# Patient Record
Sex: MALE | Race: WHITE
[De-identification: names, ages, dates, MRNs, and addresses within clinical notes are randomized per-mention and may not be internally consistent; named-entity substitution may affect disease eponyms.]

---

## 2018-06-13 ENCOUNTER — HOSPITAL ENCOUNTER (INPATIENT)
Dept: HOSPITAL 15 - ER | Age: 63
LOS: 1 days | Discharge: ANOTHER HOSPITAL | DRG: 379 | End: 2018-06-14
Attending: FAMILY MEDICINE | Admitting: INTERNAL MEDICINE
Payer: MEDICARE

## 2018-06-13 VITALS — HEIGHT: 72 IN | BODY MASS INDEX: 31.2 KG/M2 | WEIGHT: 230.38 LBS

## 2018-06-13 DIAGNOSIS — F12.10: ICD-10-CM

## 2018-06-13 DIAGNOSIS — M54.9: ICD-10-CM

## 2018-06-13 DIAGNOSIS — M19.90: ICD-10-CM

## 2018-06-13 DIAGNOSIS — E87.6: ICD-10-CM

## 2018-06-13 DIAGNOSIS — G47.00: ICD-10-CM

## 2018-06-13 DIAGNOSIS — Z88.6: ICD-10-CM

## 2018-06-13 DIAGNOSIS — K92.2: Primary | ICD-10-CM

## 2018-06-13 DIAGNOSIS — G89.29: ICD-10-CM

## 2018-06-13 DIAGNOSIS — F10.10: ICD-10-CM

## 2018-06-13 DIAGNOSIS — T39.395A: ICD-10-CM

## 2018-06-13 DIAGNOSIS — I10: ICD-10-CM

## 2018-06-13 DIAGNOSIS — Z79.82: ICD-10-CM

## 2018-06-13 DIAGNOSIS — F41.9: ICD-10-CM

## 2018-06-13 DIAGNOSIS — R31.9: ICD-10-CM

## 2018-06-13 DIAGNOSIS — Z79.899: ICD-10-CM
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
## 2018-06-13 DIAGNOSIS — E11.9: ICD-10-CM

## 2018-06-13 LAB
ALBUMIN SERPL-MCNC: 4 G/DL (ref 3.4–5)
ALCOHOL, URINE: < 3 MG/DL (ref 0–5)
ALP SERPL-CCNC: 83 U/L (ref 45–117)
ALT SERPL-CCNC: 48 U/L (ref 16–61)
AMPHETAMINES UR QL SCN: NEGATIVE
ANION GAP SERPL CALCULATED.3IONS-SCNC: 11 MMOL/L (ref 5–15)
APTT PPP: 28.5 SEC (ref 23.78–33.04)
BARBITURATES UR QL SCN: NEGATIVE
BENZODIAZ UR QL SCN: NEGATIVE
BILIRUB SERPL-MCNC: 0.3 MG/DL (ref 0.2–1)
BUN SERPL-MCNC: 18 MG/DL (ref 7–18)
BUN/CREAT SERPL: 16.7
BZE UR QL SCN: NEGATIVE
CALCIUM SERPL-MCNC: 9 MG/DL (ref 8.5–10.1)
CANNABINOIDS UR QL SCN: NEGATIVE
CHLORIDE SERPL-SCNC: 102 MMOL/L (ref 98–107)
CO2 SERPL-SCNC: 25 MMOL/L (ref 21–32)
GLUCOSE SERPL-MCNC: 99 MG/DL (ref 74–106)
HCT VFR BLD AUTO: 45.2 % (ref 41–53)
HGB BLD-MCNC: 15.4 G/DL (ref 13.5–17.5)
INR PPP: 0.88 (ref 0.9–1.15)
MAGNESIUM SERPL-MCNC: 2.2 MG/DL (ref 1.6–2.6)
MCH RBC QN AUTO: 31.1 PG (ref 28–32)
MCV RBC AUTO: 91.1 FL (ref 80–100)
NRBC BLD QL AUTO: 0.1 %
OPIATES UR QL SCN: NEGATIVE
PCP UR QL SCN: NEGATIVE
POTASSIUM SERPL-SCNC: 4.1 MMOL/L (ref 3.5–5.1)
PROT SERPL-MCNC: 8 G/DL (ref 6.4–8.2)
PROTHROMBIN TIME: 9.5 SEC (ref 9.27–12.13)
SODIUM SERPL-SCNC: 138 MMOL/L (ref 136–145)

## 2018-06-13 PROCEDURE — 86901 BLOOD TYPING SEROLOGIC RH(D): CPT

## 2018-06-13 PROCEDURE — 87081 CULTURE SCREEN ONLY: CPT

## 2018-06-13 PROCEDURE — 80307 DRUG TEST PRSMV CHEM ANLYZR: CPT

## 2018-06-13 PROCEDURE — 86900 BLOOD TYPING SEROLOGIC ABO: CPT

## 2018-06-13 PROCEDURE — 80053 COMPREHEN METABOLIC PANEL: CPT

## 2018-06-13 PROCEDURE — 81001 URINALYSIS AUTO W/SCOPE: CPT

## 2018-06-13 PROCEDURE — 96375 TX/PRO/DX INJ NEW DRUG ADDON: CPT

## 2018-06-13 PROCEDURE — 85025 COMPLETE CBC W/AUTO DIFF WBC: CPT

## 2018-06-13 PROCEDURE — 83735 ASSAY OF MAGNESIUM: CPT

## 2018-06-13 PROCEDURE — 85610 PROTHROMBIN TIME: CPT

## 2018-06-13 PROCEDURE — 36415 COLL VENOUS BLD VENIPUNCTURE: CPT

## 2018-06-13 PROCEDURE — 85018 HEMOGLOBIN: CPT

## 2018-06-13 PROCEDURE — 85730 THROMBOPLASTIN TIME PARTIAL: CPT

## 2018-06-13 PROCEDURE — 96374 THER/PROPH/DIAG INJ IV PUSH: CPT

## 2018-06-13 PROCEDURE — 84484 ASSAY OF TROPONIN QUANT: CPT

## 2018-06-13 PROCEDURE — 71046 X-RAY EXAM CHEST 2 VIEWS: CPT

## 2018-06-13 PROCEDURE — 80061 LIPID PANEL: CPT

## 2018-06-13 PROCEDURE — 86850 RBC ANTIBODY SCREEN: CPT

## 2018-06-13 PROCEDURE — 85014 HEMATOCRIT: CPT

## 2018-06-13 PROCEDURE — 85045 AUTOMATED RETICULOCYTE COUNT: CPT

## 2018-06-13 PROCEDURE — 74176 CT ABD & PELVIS W/O CONTRAST: CPT

## 2018-06-13 PROCEDURE — 82962 GLUCOSE BLOOD TEST: CPT

## 2018-06-13 PROCEDURE — 93005 ELECTROCARDIOGRAM TRACING: CPT

## 2018-06-13 PROCEDURE — 83690 ASSAY OF LIPASE: CPT

## 2018-06-13 RX ADMIN — SODIUM CHLORIDE SCH MLS/HR: 0.9 INJECTION, SOLUTION INTRAVENOUS at 19:44

## 2018-06-13 RX ADMIN — BENAZEPRIL HYDROCHLORIDE SCH MG: 10 TABLET, FILM COATED ORAL at 19:45

## 2018-06-14 VITALS — DIASTOLIC BLOOD PRESSURE: 79 MMHG | SYSTOLIC BLOOD PRESSURE: 146 MMHG

## 2018-06-14 VITALS — DIASTOLIC BLOOD PRESSURE: 80 MMHG | SYSTOLIC BLOOD PRESSURE: 150 MMHG

## 2018-06-14 VITALS — SYSTOLIC BLOOD PRESSURE: 162 MMHG | DIASTOLIC BLOOD PRESSURE: 74 MMHG

## 2018-06-14 LAB
CHOLEST SERPL-MCNC: 201 MG/DL (ref ?–200)
HCT VFR BLD AUTO: 39.6 % (ref 41–53)
HCT VFR BLD AUTO: 41 % (ref 41–53)
HCT VFR BLD AUTO: 42.3 % (ref 41–53)
HDLC SERPL-MCNC: 44 MG/DL (ref 40–59)
HGB BLD-MCNC: 13.8 G/DL (ref 13.5–17.5)
HGB BLD-MCNC: 13.9 G/DL (ref 13.5–17.5)
HGB BLD-MCNC: 14.6 G/DL (ref 13.5–17.5)
MCH RBC QN AUTO: 32 PG (ref 28–32)
MCV RBC AUTO: 91.6 FL (ref 80–100)
NRBC BLD QL AUTO: 0.1 %
TRIGL SERPL-MCNC: 241 MG/DL (ref ?–150)

## 2018-06-14 RX ADMIN — BENAZEPRIL HYDROCHLORIDE SCH MG: 10 TABLET, FILM COATED ORAL at 10:00

## 2018-06-14 RX ADMIN — SODIUM CHLORIDE SCH MLS/HR: 0.9 INJECTION, SOLUTION INTRAVENOUS at 11:24

## 2018-06-14 RX ADMIN — SODIUM CHLORIDE SCH MLS/HR: 0.9 INJECTION, SOLUTION INTRAVENOUS at 02:16

## 2019-02-22 ENCOUNTER — HOSPITAL ENCOUNTER (INPATIENT)
Dept: HOSPITAL 15 - ER | Age: 64
LOS: 3 days | Discharge: HOME | DRG: 309 | End: 2019-02-25
Attending: INTERNAL MEDICINE | Admitting: NURSE PRACTITIONER
Payer: COMMERCIAL

## 2019-02-22 VITALS — WEIGHT: 240.3 LBS | BODY MASS INDEX: 32.55 KG/M2 | HEIGHT: 72 IN

## 2019-02-22 DIAGNOSIS — Z82.3: ICD-10-CM

## 2019-02-22 DIAGNOSIS — Z79.899: ICD-10-CM

## 2019-02-22 DIAGNOSIS — E11.9: ICD-10-CM

## 2019-02-22 DIAGNOSIS — I10: ICD-10-CM

## 2019-02-22 DIAGNOSIS — Z82.49: ICD-10-CM

## 2019-02-22 DIAGNOSIS — I24.9: ICD-10-CM

## 2019-02-22 DIAGNOSIS — Z79.82: ICD-10-CM

## 2019-02-22 DIAGNOSIS — E66.01: ICD-10-CM

## 2019-02-22 DIAGNOSIS — Z88.8: ICD-10-CM

## 2019-02-22 DIAGNOSIS — E78.5: ICD-10-CM

## 2019-02-22 DIAGNOSIS — I49.3: Primary | ICD-10-CM

## 2019-02-22 DIAGNOSIS — I08.0: ICD-10-CM

## 2019-02-22 DIAGNOSIS — I25.10: ICD-10-CM

## 2019-02-22 DIAGNOSIS — F41.9: ICD-10-CM

## 2019-02-22 DIAGNOSIS — F12.90: ICD-10-CM

## 2019-02-22 LAB
ALBUMIN SERPL-MCNC: 4 G/DL (ref 3.4–5)
ALCOHOL, URINE: < 3 MG/DL (ref 0–5)
ALP SERPL-CCNC: 63 U/L (ref 45–117)
ALT SERPL-CCNC: 36 U/L (ref 16–61)
AMPHETAMINES UR QL SCN: NEGATIVE
ANION GAP SERPL CALCULATED.3IONS-SCNC: 10 MMOL/L (ref 5–15)
BARBITURATES UR QL SCN: NEGATIVE
BENZODIAZ UR QL SCN: NEGATIVE
BILIRUB SERPL-MCNC: 0.3 MG/DL (ref 0.2–1)
BUN SERPL-MCNC: 11 MG/DL (ref 7–18)
BUN/CREAT SERPL: 12.4
BZE UR QL SCN: NEGATIVE
CALCIUM SERPL-MCNC: 8.5 MG/DL (ref 8.5–10.1)
CANNABINOIDS UR QL SCN: NEGATIVE
CHLORIDE SERPL-SCNC: 106 MMOL/L (ref 98–107)
CO2 SERPL-SCNC: 24 MMOL/L (ref 21–32)
GLUCOSE SERPL-MCNC: 121 MG/DL (ref 74–106)
HCT VFR BLD AUTO: 45.6 % (ref 41–53)
HGB BLD-MCNC: 15.7 G/DL (ref 13.5–17.5)
MAGNESIUM SERPL-MCNC: 2 MG/DL (ref 1.6–2.6)
MCH RBC QN AUTO: 31.5 PG (ref 28–32)
MCV RBC AUTO: 91.9 FL (ref 80–100)
NRBC BLD QL AUTO: 0 %
OPIATES UR QL SCN: NEGATIVE
PCP UR QL SCN: NEGATIVE
POTASSIUM SERPL-SCNC: 3.6 MMOL/L (ref 3.5–5.1)
PROT SERPL-MCNC: 8.2 G/DL (ref 6.4–8.2)
SODIUM SERPL-SCNC: 140 MMOL/L (ref 136–145)

## 2019-02-22 PROCEDURE — 83735 ASSAY OF MAGNESIUM: CPT

## 2019-02-22 PROCEDURE — 93017 CV STRESS TEST TRACING ONLY: CPT

## 2019-02-22 PROCEDURE — 83880 ASSAY OF NATRIURETIC PEPTIDE: CPT

## 2019-02-22 PROCEDURE — 36415 COLL VENOUS BLD VENIPUNCTURE: CPT

## 2019-02-22 PROCEDURE — 93005 ELECTROCARDIOGRAM TRACING: CPT

## 2019-02-22 PROCEDURE — 80307 DRUG TEST PRSMV CHEM ANLYZR: CPT

## 2019-02-22 PROCEDURE — 80053 COMPREHEN METABOLIC PANEL: CPT

## 2019-02-22 PROCEDURE — 82962 GLUCOSE BLOOD TEST: CPT

## 2019-02-22 PROCEDURE — 80048 BASIC METABOLIC PNL TOTAL CA: CPT

## 2019-02-22 PROCEDURE — 78452 HT MUSCLE IMAGE SPECT MULT: CPT

## 2019-02-22 PROCEDURE — 71045 X-RAY EXAM CHEST 1 VIEW: CPT

## 2019-02-22 PROCEDURE — 84484 ASSAY OF TROPONIN QUANT: CPT

## 2019-02-22 PROCEDURE — 85025 COMPLETE CBC W/AUTO DIFF WBC: CPT

## 2019-02-22 PROCEDURE — 84443 ASSAY THYROID STIM HORMONE: CPT

## 2019-02-22 PROCEDURE — 81001 URINALYSIS AUTO W/SCOPE: CPT

## 2019-02-22 NOTE — NUR
Opening Shift Note

Assumed care of patient, awake and alert x 4.  No S/S of distress/SOB or pain. Bed is in 
lowest position and locked. Call light within reach. Board updated. Patient does report 
anxiety so Alprazolam will be given per MD order. Tele box number matches monitor and leads 
are in correct placement. Instructed on POC and to call for assist PRN, will continue to 
monitor for changes Q1hr and PRN.

-------------------------------------------------------------------------------

Addendum: 02/23/19 at 1532 by LEATHA TREVIZO RN

-------------------------------------------------------------------------------

wrong day, disregard

## 2019-02-23 VITALS — DIASTOLIC BLOOD PRESSURE: 72 MMHG | SYSTOLIC BLOOD PRESSURE: 121 MMHG

## 2019-02-23 VITALS — SYSTOLIC BLOOD PRESSURE: 127 MMHG | DIASTOLIC BLOOD PRESSURE: 64 MMHG

## 2019-02-23 VITALS — DIASTOLIC BLOOD PRESSURE: 73 MMHG | SYSTOLIC BLOOD PRESSURE: 151 MMHG

## 2019-02-23 VITALS — SYSTOLIC BLOOD PRESSURE: 145 MMHG | DIASTOLIC BLOOD PRESSURE: 77 MMHG

## 2019-02-23 VITALS — SYSTOLIC BLOOD PRESSURE: 121 MMHG | DIASTOLIC BLOOD PRESSURE: 70 MMHG

## 2019-02-23 VITALS — SYSTOLIC BLOOD PRESSURE: 125 MMHG | DIASTOLIC BLOOD PRESSURE: 65 MMHG

## 2019-02-23 RX ADMIN — FAMOTIDINE SCH MG: 20 TABLET, FILM COATED ORAL at 09:48

## 2019-02-23 RX ADMIN — Medication SCH STRIP: at 23:04

## 2019-02-23 RX ADMIN — HUMAN INSULIN SCH UNITS: 100 INJECTION, SOLUTION SUBCUTANEOUS at 17:45

## 2019-02-23 RX ADMIN — HUMAN INSULIN SCH UNITS: 100 INJECTION, SOLUTION SUBCUTANEOUS at 05:50

## 2019-02-23 RX ADMIN — Medication SCH STRIP: at 11:57

## 2019-02-23 RX ADMIN — ACETAMINOPHEN PRN MG: 325 TABLET ORAL at 21:22

## 2019-02-23 RX ADMIN — FAMOTIDINE SCH MG: 20 TABLET, FILM COATED ORAL at 21:23

## 2019-02-23 RX ADMIN — Medication SCH STRIP: at 05:50

## 2019-02-23 RX ADMIN — Medication SCH STRIP: at 17:44

## 2019-02-23 RX ADMIN — HUMAN INSULIN SCH UNITS: 100 INJECTION, SOLUTION SUBCUTANEOUS at 23:04

## 2019-02-23 RX ADMIN — METOPROLOL SUCCINATE SCH MG: 50 TABLET, EXTENDED RELEASE ORAL at 09:47

## 2019-02-23 RX ADMIN — HUMAN INSULIN SCH UNITS: 100 INJECTION, SOLUTION SUBCUTANEOUS at 11:58

## 2019-02-23 RX ADMIN — ATORVASTATIN CALCIUM SCH MG: 20 TABLET, FILM COATED ORAL at 21:22

## 2019-02-23 NOTE — NUR
DR. VERA HERE TO SEE AND EXAMINED PATIENT,RECEIVED ORDER TO TRANSFER PATIENT TO [facility] PER INSURANCE REQUEST.

-------------------------------------------------------------------------------

Addendum: 02/23/19 at 1324 by Usha Barrera RN RN

-------------------------------------------------------------------------------

TIME @1300

## 2019-02-23 NOTE — NUR
CLOSING SHIFT NOTE 

Gave report to day shift RN. Patient is A&O X's 4 with no s/s of distress. Endorsed care 
over to RN.

## 2019-02-23 NOTE — NUR
Opening Shift Note

Assumed care of patient, awake and alert x 4.  No S/S of distress/SOB or pain. Bed is in 
lowest position and locked. Call light within reach. Board updated. Patient does report 
anxiety so Alprazolam will be given per MD order. Tele box number matches monitor and leads 
are in correct placement. Instructed on POC and to call for assist PRN, will continue to 
monitor for changes Q1hr and PRN.

## 2019-02-23 NOTE — NUR
CALLED AND SPOKE TO SALMA ENGLAND RN HOUSE SUPERVISOR OF [facility] RE 
TRANSFER,REQUESTED FACE SHEET TO BE FAX

-------------------------------------------------------------------------------

Addendum: 02/23/19 at 1627 by Usha Barrera RN RN

-------------------------------------------------------------------------------

1626 STATED NO BED AVAILABLE AT THIS TIME

## 2019-02-23 NOTE — NUR
DR. VERA CALLED INFORMED OF PATIENT SITUATION AND COMPLAINT,RECEIVED ORDER TO INCREASE 
XANAX TO Q 6 HOURS.SEE ORDER WRITTEN.

## 2019-02-23 NOTE — NUR
PATIENT CALLED HAVING PANIC AND ANXIETY ATTACK DUE TO TRANSFER AND DX. OF CHF,REQUESTING FOR 
XANAX,MEDICATION NOT DUE,REASSURED WILL CALL MD TO INCREASE FREQUENCY,PAGED DR. VERA.

## 2019-02-23 NOTE — NUR
Open Shift Note

Received report and continuation of care,Awake,alert oriented no distress,no discomfort, 
Discussed Nursing Routines and POC with patient. Bed in lowest locked position, side rails 
up x2, and call light within reach. Will continue to monitor.

## 2019-02-23 NOTE — NUR
XOCHILT  CALLED BACK MADE AWARE OF ORDER TO TRANSFER TO [facility] PER INSURANCE 
REQUEST

## 2019-02-23 NOTE — NUR
PATIENT ARRIVED TO UNIT 

Patient arrived to unit from ED via wheelchair. Patient ambulatory, A&O X's 4 with no s/s of 
distress and reports no pain. Oriented patient to unit and room (call light/lights/visiting 
hours/tv/phone). Educated patient no POC and to call when in need of assistance/ increase 
SOB or any pain. Patient verbalized understanding. Bed is in lowest/locked position with 
side rails up X's 2. Call light is within reach of patient. Will continue to monitor and 
round hourly/PRN.

## 2019-02-23 NOTE — NUR
Patient refused Lipitor and Pepcid. patient was educated on why they have been ordered for 
patient and medication side effects but patient still refused, saying that Lipitor makes it 
harder for him to sleep at night.

## 2019-02-24 VITALS — DIASTOLIC BLOOD PRESSURE: 61 MMHG | SYSTOLIC BLOOD PRESSURE: 110 MMHG

## 2019-02-24 VITALS — SYSTOLIC BLOOD PRESSURE: 117 MMHG | DIASTOLIC BLOOD PRESSURE: 59 MMHG

## 2019-02-24 VITALS — SYSTOLIC BLOOD PRESSURE: 132 MMHG | DIASTOLIC BLOOD PRESSURE: 64 MMHG

## 2019-02-24 VITALS — SYSTOLIC BLOOD PRESSURE: 124 MMHG | DIASTOLIC BLOOD PRESSURE: 74 MMHG

## 2019-02-24 VITALS — SYSTOLIC BLOOD PRESSURE: 144 MMHG | DIASTOLIC BLOOD PRESSURE: 60 MMHG

## 2019-02-24 LAB
ANION GAP SERPL CALCULATED.3IONS-SCNC: 8 MMOL/L (ref 5–15)
BUN SERPL-MCNC: 14 MG/DL (ref 7–18)
BUN/CREAT SERPL: 15.2
CALCIUM SERPL-MCNC: 8.9 MG/DL (ref 8.5–10.1)
CHLORIDE SERPL-SCNC: 106 MMOL/L (ref 98–107)
CO2 SERPL-SCNC: 27 MMOL/L (ref 21–32)
GLUCOSE SERPL-MCNC: 103 MG/DL (ref 74–106)
POTASSIUM SERPL-SCNC: 3.6 MMOL/L (ref 3.5–5.1)
SODIUM SERPL-SCNC: 141 MMOL/L (ref 136–145)

## 2019-02-24 RX ADMIN — Medication SCH STRIP: at 06:16

## 2019-02-24 RX ADMIN — Medication SCH STRIP: at 17:40

## 2019-02-24 RX ADMIN — ATORVASTATIN CALCIUM SCH MG: 20 TABLET, FILM COATED ORAL at 22:00

## 2019-02-24 RX ADMIN — HUMAN INSULIN SCH UNITS: 100 INJECTION, SOLUTION SUBCUTANEOUS at 17:40

## 2019-02-24 RX ADMIN — HUMAN INSULIN SCH UNITS: 100 INJECTION, SOLUTION SUBCUTANEOUS at 05:58

## 2019-02-24 RX ADMIN — METOPROLOL SUCCINATE SCH MG: 50 TABLET, EXTENDED RELEASE ORAL at 09:24

## 2019-02-24 RX ADMIN — FAMOTIDINE SCH MG: 20 TABLET, FILM COATED ORAL at 09:25

## 2019-02-24 RX ADMIN — ACETAMINOPHEN PRN MG: 325 TABLET ORAL at 21:18

## 2019-02-24 RX ADMIN — FAMOTIDINE SCH MG: 20 TABLET, FILM COATED ORAL at 22:00

## 2019-02-24 RX ADMIN — HUMAN INSULIN SCH UNITS: 100 INJECTION, SOLUTION SUBCUTANEOUS at 12:00

## 2019-02-24 RX ADMIN — Medication SCH STRIP: at 12:50

## 2019-02-24 NOTE — NUR
Called Octavio House Supervisor on nights for [facility] and asked about 
status of patient's transfer. Per Octavio, there are many patient's holding in their ED and no 
beds are available at this time.

## 2019-02-24 NOTE — NUR
Opening Note 

Assumed care of patient. Patient is awake, alert and oriented x4. No signs or symptoms of 
distress noted at this time. Reviewed plan of care with patient, patient verbalized 
understanding. Bed in low and locked position, call light within in reach. Will continue to 
monitor Q1 hour and PRN.

## 2019-02-24 NOTE — NUR
Received a call from [facility]. They were unaware that we had been 
speaking to the House Supervisor. I updated them on the patient's condition. They will be 
getting back to us.

## 2019-02-24 NOTE — NUR
Per Dr Perales, he has spoke to [facility] and patient will be given a bed sometime today.

## 2019-02-24 NOTE — NUR
Patient refuses to have his blood sugar checked for need for insulin coverage. patient 
states that his blood sugar has been normal all day and that he does not take insulin at 
home, only metformin. I explained to the patient the need for frequent blood sugar checks 
and need for insulin coverage now that patient is hospitalized but patient still refused.

## 2019-02-25 VITALS — DIASTOLIC BLOOD PRESSURE: 53 MMHG | SYSTOLIC BLOOD PRESSURE: 110 MMHG

## 2019-02-25 VITALS — SYSTOLIC BLOOD PRESSURE: 108 MMHG | DIASTOLIC BLOOD PRESSURE: 68 MMHG

## 2019-02-25 VITALS — SYSTOLIC BLOOD PRESSURE: 150 MMHG | DIASTOLIC BLOOD PRESSURE: 69 MMHG

## 2019-02-25 VITALS — SYSTOLIC BLOOD PRESSURE: 120 MMHG | DIASTOLIC BLOOD PRESSURE: 72 MMHG

## 2019-02-25 RX ADMIN — HUMAN INSULIN SCH UNITS: 100 INJECTION, SOLUTION SUBCUTANEOUS at 11:09

## 2019-02-25 RX ADMIN — HUMAN INSULIN SCH UNITS: 100 INJECTION, SOLUTION SUBCUTANEOUS at 05:54

## 2019-02-25 RX ADMIN — Medication SCH STRIP: at 05:54

## 2019-02-25 RX ADMIN — FAMOTIDINE SCH MG: 20 TABLET, FILM COATED ORAL at 10:00

## 2019-02-25 RX ADMIN — Medication SCH STRIP: at 11:07

## 2019-02-25 RX ADMIN — METOPROLOL SUCCINATE SCH MG: 50 TABLET, EXTENDED RELEASE ORAL at 10:57

## 2019-02-25 RX ADMIN — HUMAN INSULIN SCH UNITS: 100 INJECTION, SOLUTION SUBCUTANEOUS at 00:00

## 2019-02-25 RX ADMIN — Medication SCH STRIP: at 00:00

## 2019-02-25 NOTE — NUR
Opening Shift Note

REceived report from Summer RN. Assumed care of patient, awake and alert.  No S/S of 
distress/SOB or pain.Emphasized NPO for stress test today. Awaiting bed availability at [facility] 
and patient is aware of it.  Instructed on POC and to call for assist PRN, will continue to 
monitor for changes Q1hr and PRN.

## 2019-02-25 NOTE — NUR
on call

Received a page from Galdino PEREIRA for transfer to in network facility [facility]. Clinicals faxed to 
[facility]. Waiting on bed now. 

-------------------------------------------------------------------------------

Addendum: 02/25/19 at 0821 by Lenka HERNANDEZ

-------------------------------------------------------------------------------

Amended: Links added.

## 2019-02-25 NOTE — NUR
CALLED [facility] APPOINTMENT HOTLINE, SAID THAT THEY CANNOT RESCHEDULE APPOINTMENT FOR NOW. PER 
TAMMIE PATIENT NEEDS TO CALL WHEN HE'S ALREADY AT HOME. PATIENT MADE AWARE AND VERBALIZED 
UNDERSTANDING.

## 2019-02-25 NOTE — NUR
Discharge instructions given as ordered. Encourage to follow up with PMD as instructed. All 
questions and concerns addressed. Patient verbalized understanding. IV removed with catheter 
intact, pressure dressing applied, orozco catheter removed.  Telemetry unit returned to [facility]. 
Patient went home ambulatory, with all personal belongings, accompanied by staff and family 
member. No distress noted at time of departure.

## 2019-02-25 NOTE — NUR
IV insertion

IV access obtained, via clean sterile technique by inserting 22 gauge catheter in left 
forearm, after one attempt. IV secured properly. No trauma to site. Patient tolerated 
procedure well.

## 2019-02-25 NOTE — NUR
RECEIVED VERBAL ORDER FROM DR. VERA TO WAIT FOR PATIENT'S CARDIO CLEARANCE FROM DR. PIMENTEL 
BEFORE PATIENT GOES HOME. PATIENT MADE AWARE AND VERBALIZED UNDERSTANDING.

## 2019-02-25 NOTE — NUR
RECEIVED VERBAL ORDER FROM DR. PIMENTEL THAT PATIENT IS CLEARED TO GO HOME. HAVE TSH BLOOD 
WITHDRAWN, NO NEED TO WAIT FOR STRESS TEST AND TSH RESULTS. PATIENT MADE AWARE AND 
VERBALIZED UNDERSTANDING.

## 2019-02-25 NOTE — NUR
Patient refused accucheck at 0000. 

Stated he wants to sleep, to come back and check in the morning

## 2019-08-19 ENCOUNTER — HOSPITAL ENCOUNTER (EMERGENCY)
Dept: HOSPITAL 15 - ER | Age: 64
Discharge: HOME | End: 2019-08-19
Payer: COMMERCIAL

## 2019-08-19 VITALS — BODY MASS INDEX: 33.86 KG/M2 | WEIGHT: 250 LBS | HEIGHT: 72 IN

## 2019-08-19 VITALS — SYSTOLIC BLOOD PRESSURE: 135 MMHG | DIASTOLIC BLOOD PRESSURE: 65 MMHG

## 2019-08-19 DIAGNOSIS — E78.5: ICD-10-CM

## 2019-08-19 DIAGNOSIS — F10.229: ICD-10-CM
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
## 2019-08-19 DIAGNOSIS — I10: ICD-10-CM
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
## 2019-08-19 DIAGNOSIS — Z79.84: ICD-10-CM

## 2019-08-19 DIAGNOSIS — Z79.82: ICD-10-CM

## 2019-08-19 DIAGNOSIS — Z79.899: ICD-10-CM
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
## 2019-08-19 DIAGNOSIS — F12.90: ICD-10-CM

## 2019-08-19 DIAGNOSIS — E11.9: ICD-10-CM

## 2019-08-19 DIAGNOSIS — Z88.8: ICD-10-CM
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
## 2019-08-19 DIAGNOSIS — S09.90XA: Primary | ICD-10-CM

## 2019-08-19 LAB
ALBUMIN SERPL-MCNC: 3.9 G/DL (ref 3.4–5)
ALCOHOL, URINE: 434 MG/DL (ref 0–5)
ALP SERPL-CCNC: 68 U/L (ref 45–117)
ALT SERPL-CCNC: 62 U/L (ref 16–61)
AMPHETAMINES UR QL SCN: NEGATIVE
ANION GAP SERPL CALCULATED.3IONS-SCNC: 17 MMOL/L (ref 5–15)
BARBITURATES UR QL SCN: NEGATIVE
BENZODIAZ UR QL SCN: POSITIVE
BILIRUB SERPL-MCNC: 0.6 MG/DL (ref 0.2–1)
BUN SERPL-MCNC: 17 MG/DL (ref 7–18)
BUN/CREAT SERPL: 17.5
BZE UR QL SCN: NEGATIVE
CALCIUM SERPL-MCNC: 8.5 MG/DL (ref 8.5–10.1)
CANNABINOIDS UR QL SCN: NEGATIVE
CHLORIDE SERPL-SCNC: 101 MMOL/L (ref 98–107)
CO2 SERPL-SCNC: 19 MMOL/L (ref 21–32)
GLUCOSE SERPL-MCNC: 129 MG/DL (ref 74–106)
HCT VFR BLD AUTO: 47.5 % (ref 41–53)
HGB BLD-MCNC: 16.3 G/DL (ref 13.5–17.5)
MCH RBC QN AUTO: 31.5 PG (ref 28–32)
MCV RBC AUTO: 91.7 FL (ref 80–100)
NRBC BLD QL AUTO: 0 %
OPIATES UR QL SCN: NEGATIVE
PCP UR QL SCN: NEGATIVE
POTASSIUM SERPL-SCNC: 3.9 MMOL/L (ref 3.5–5.1)
PROT SERPL-MCNC: 7.9 G/DL (ref 6.4–8.2)
SODIUM SERPL-SCNC: 137 MMOL/L (ref 136–145)

## 2019-08-19 PROCEDURE — 96366 THER/PROPH/DIAG IV INF ADDON: CPT

## 2019-08-19 PROCEDURE — 74176 CT ABD & PELVIS W/O CONTRAST: CPT

## 2019-08-19 PROCEDURE — 99284 EMERGENCY DEPT VISIT MOD MDM: CPT

## 2019-08-19 PROCEDURE — 36415 COLL VENOUS BLD VENIPUNCTURE: CPT

## 2019-08-19 PROCEDURE — 94761 N-INVAS EAR/PLS OXIMETRY MLT: CPT

## 2019-08-19 PROCEDURE — 85025 COMPLETE CBC W/AUTO DIFF WBC: CPT

## 2019-08-19 PROCEDURE — 93005 ELECTROCARDIOGRAM TRACING: CPT

## 2019-08-19 PROCEDURE — 80307 DRUG TEST PRSMV CHEM ANLYZR: CPT

## 2019-08-19 PROCEDURE — 96365 THER/PROPH/DIAG IV INF INIT: CPT

## 2019-08-19 PROCEDURE — 72125 CT NECK SPINE W/O DYE: CPT

## 2019-08-19 PROCEDURE — 80320 DRUG SCREEN QUANTALCOHOLS: CPT

## 2019-08-19 PROCEDURE — 70450 CT HEAD/BRAIN W/O DYE: CPT

## 2019-08-19 PROCEDURE — 80053 COMPREHEN METABOLIC PANEL: CPT
